# Patient Record
Sex: MALE | Race: BLACK OR AFRICAN AMERICAN | NOT HISPANIC OR LATINO | Employment: UNEMPLOYED | URBAN - METROPOLITAN AREA
[De-identification: names, ages, dates, MRNs, and addresses within clinical notes are randomized per-mention and may not be internally consistent; named-entity substitution may affect disease eponyms.]

---

## 2023-03-14 NOTE — TELEPHONE ENCOUNTER
DIAGNOSIS: toenail fungus/[facility]/ortho con   APPOINTMENT DATE: 04/11/2023   NOTES STATUS DETAILS   OFFICE NOTE from referring provider SELF

## 2023-04-11 ENCOUNTER — OFFICE VISIT (OUTPATIENT)
Dept: ORTHOPEDICS | Facility: CLINIC | Age: 53
End: 2023-04-11
Payer: COMMERCIAL

## 2023-04-11 ENCOUNTER — PRE VISIT (OUTPATIENT)
Dept: ORTHOPEDICS | Facility: CLINIC | Age: 53
End: 2023-04-11

## 2023-04-11 DIAGNOSIS — B35.1 OM (ONYCHOMYCOSIS): Primary | ICD-10-CM

## 2023-04-11 PROCEDURE — 99203 OFFICE O/P NEW LOW 30 MIN: CPT | Performed by: PODIATRIST

## 2023-04-11 RX ORDER — TADALAFIL 10 MG/1
1 TABLET ORAL DAILY PRN
COMMUNITY
Start: 2023-04-10 | End: 2023-08-08

## 2023-04-11 RX ORDER — AMLODIPINE BESYLATE 10 MG/1
10 TABLET ORAL
COMMUNITY
Start: 2023-02-27

## 2023-04-11 RX ORDER — TERBINAFINE HYDROCHLORIDE 250 MG/1
TABLET ORAL
Qty: 28 TABLET | Refills: 0 | Status: SHIPPED | OUTPATIENT
Start: 2023-04-11 | End: 2023-08-08

## 2023-04-11 RX ORDER — LISINOPRIL 5 MG/1
5 TABLET ORAL DAILY
COMMUNITY
Start: 2023-04-04 | End: 2023-08-08

## 2023-04-11 RX ORDER — CARVEDILOL 25 MG/1
25 TABLET ORAL 2 TIMES DAILY
COMMUNITY
Start: 2023-02-27 | End: 2023-08-08

## 2023-04-11 NOTE — LETTER
4/11/2023         RE: Akin Ludwig  [address]  [facility] [address]        Dear Colleague,    Thank you for referring your patient, Akin Ludwig, to the [facility] [place]. Please see a copy of my visit note below.    Date of Service: 4/11/2023    Chief Complaint:   Chief Complaint   Patient presents with     Consult     Toenail fungus        HPI: Akin is a 52 year old male who presents today for further evaluation of bilateral nail fungus.  He relates that he has tried multiple treatments for this.  History of multiple over-the-counter and prescribed topicals.  He does not want joint of the palpable.  He would like to know if there are other treatment options for him.  He does have chronic kidney disease.    Review of Systems: No nausea, vomiting, diarrhea, fever, chills, night sweats, shortness of breath, chest pain.    PMH: CKD, HTN.     PSxH: No past surgical history on file.    Allergies: Patient has no allergy information on record.    SH:   Social History     Socioeconomic History     Marital status: Single     Spouse name: Not on file     Number of children: Not on file     Years of education: Not on file     Highest education level: Not on file   Occupational History     Not on file   Tobacco Use     Smoking status: Not on file     Smokeless tobacco: Not on file   Substance and Sexual Activity     Alcohol use: Not on file     Drug use: Not on file     Sexual activity: Not on file   Other Topics Concern     Not on file   Social History Narrative     Not on file     Social Determinants of Health     Financial Resource Strain: Not on file   Food Insecurity: Not on file   Transportation Needs: Not on file   Physical Activity: Not on file   Stress: Not on file   Social Connections: Not on file   Intimate Partner Violence: Not on file   Housing Stability: Not on file       FH: No family history on file.    Objective:  Contains abnormal data PANEL  BASIC METABOLIC (BMP)  Order: [id number]   Ref Range & Units 13 d ago   CO2 22 - 30 mEq/L 26    Glucose 70 - 100 mg/dL 106 High     BUN 6 - 20 mg/dL 37 High     Creatinine 0.70 - 1.25 mg/dL 1.97 High     Calcium 8.6 - 10.0 mg/dL 9.4    Sodium 135 - 148 mEq/L 140    Potassium 3.5 - 5.3 mEq/L 4.1    Chloride 92 - 108 mEq/L 104    AnGap 8 - 16 mEq/L 10    eGFR, High >=60 ml/min/1.73m2 44 Low     Comment: Calculated using CKD-EPI equation   eGFR, Low >=60 ml/min/1.73m2 38 Low     Comment: Calculated using CKD-EPI equation   [facility]  [facility]   Specimen Collected: 03/30/23  8:07 AM Last Resulted: 03/30/23  9:51 AM   Received From: [facility]  Result Received: 04/11/23  3:56 PM       PANEL HEPATIC FUNCTION  Order: [id number]   Ref Range & Units 3 mo ago   Total Protein 6.4 - 8.3 g/dL 7.4    Albumin 3.8 - 5.1 g/dL 4.4    Bili Total 0.1 - 1.2 mg/dL 0.4    Bili Direct <=0.3 mg/dL <0.2    Alk Phos 40 - 129 IU/L 70    ALT (SGPT) <=41 IU/L 11    AST(SGOT) 5 - 40 IU/L 12    Resulting [facility]   Specimen Collected: 01/11/23  8:58 AM Last Resulted: 01/11/23 10:11 AM   Received From: [facility]  Result Received: 04/11/23  3:56 PM         PT and DP pulses are 2/4 bilaterally. CRT is instant.  Positive pedal hair.   Gross sensation is intact bilaterally.   Equinus is noted bilaterally. No pain with active or passive ROM of the ankle, MTJ, 1st ray, or halluces bilaterally,.   Nails of the left first, second, third, and fifth toes and the right lesser digits are thickened, discolored, brittle, with subungual debris. No open lesions are noted.     Assessment:   Encounter Diagnoses   Name Primary?     OM (onychomycosis) Yes         Plan:  - Pt seen and evaluated.  -We discussed treatment options.  He does have a strong desire to get this cured.  He has tried topicals.  These have not helped much.  I recommended a pulse dose of Lamisil.  This is 1 week on 250 mg and 3 weeks off.  He should continue this  for 4 months.  Hepatic panel is normal.  He does agree to this.  -We will see again in 4 months.               Again, thank you for allowing me to participate in the care of your patient.        Sincerely,        Tyrese Saldaña DPM

## 2023-04-12 NOTE — PROGRESS NOTES
Date of Service: 4/11/2023    Chief Complaint:   Chief Complaint   Patient presents with     Consult     Toenail fungus        HPI: Akin is a 52 year old male who presents today for further evaluation of bilateral nail fungus.  He relates that he has tried multiple treatments for this.  History of multiple over-the-counter and prescribed topicals.  He does not want joint of the palpable.  He would like to know if there are other treatment options for him.  He does have chronic kidney disease.    Review of Systems: No nausea, vomiting, diarrhea, fever, chills, night sweats, shortness of breath, chest pain.    PMH: CKD, HTN.     PSxH: No past surgical history on file.    Allergies: Patient has no allergy information on record.    SH:   Social History     Socioeconomic History     Marital status: Single     Spouse name: Not on file     Number of children: Not on file     Years of education: Not on file     Highest education level: Not on file   Occupational History     Not on file   Tobacco Use     Smoking status: Not on file     Smokeless tobacco: Not on file   Substance and Sexual Activity     Alcohol use: Not on file     Drug use: Not on file     Sexual activity: Not on file   Other Topics Concern     Not on file   Social History Narrative     Not on file     Social Determinants of Health     Financial Resource Strain: Not on file   Food Insecurity: Not on file   Transportation Needs: Not on file   Physical Activity: Not on file   Stress: Not on file   Social Connections: Not on file   Intimate Partner Violence: Not on file   Housing Stability: Not on file       FH: No family history on file.    Objective:  Contains abnormal data PANEL BASIC METABOLIC (BMP)  Order: [id number]   Ref Range & Units 13 d ago   CO2 22 - 30 mEq/L 26    Glucose 70 - 100 mg/dL 106 High     BUN 6 - 20 mg/dL 37 High     Creatinine 0.70 - 1.25 mg/dL 1.97 High     Calcium 8.6 - 10.0 mg/dL 9.4    Sodium 135 - 148 mEq/L 140    Potassium 3.5 -  5.3 mEq/L 4.1    Chloride 92 - 108 mEq/L 104    AnGap 8 - 16 mEq/L 10    eGFR, High >=60 ml/min/1.73m2 44 Low     Comment: Calculated using CKD-EPI equation   eGFR, Low >=60 ml/min/1.73m2 38 Low     Comment: Calculated using CKD-EPI equation   Resulting Agency  [facility]   Specimen Collected: 03/30/23  8:07 AM Last Resulted: 03/30/23  9:51 AM   Received From: [facility]  Result Received: 04/11/23  3:56 PM       PANEL HEPATIC FUNCTION  Order: [id number]   Ref Range & Units 3 mo ago   Total Protein 6.4 - 8.3 g/dL 7.4    Albumin 3.8 - 5.1 g/dL 4.4    Bili Total 0.1 - 1.2 mg/dL 0.4    Bili Direct <=0.3 mg/dL <0.2    Alk Phos 40 - 129 IU/L 70    ALT (SGPT) <=41 IU/L 11    AST(SGOT) 5 - 40 IU/L 12    Resulting [facility]   Specimen Collected: 01/11/23  8:58 AM Last Resulted: 01/11/23 10:11 AM   Received From: [facility]  Result Received: 04/11/23  3:56 PM         PT and DP pulses are 2/4 bilaterally. CRT is instant.  Positive pedal hair.   Gross sensation is intact bilaterally.   Equinus is noted bilaterally. No pain with active or passive ROM of the ankle, MTJ, 1st ray, or halluces bilaterally,.   Nails of the left first, second, third, and fifth toes and the right lesser digits are thickened, discolored, brittle, with subungual debris. No open lesions are noted.     Assessment:   Encounter Diagnoses   Name Primary?     OM (onychomycosis) Yes         Plan:  - Pt seen and evaluated.  -We discussed treatment options.  He does have a strong desire to get this cured.  He has tried topicals.  These have not helped much.  I recommended a pulse dose of Lamisil.  This is 1 week on 250 mg and 3 weeks off.  He should continue this for 4 months.  Hepatic panel is normal.  He does agree to this.  -We will see again in 4 months.

## 2023-05-21 ENCOUNTER — HEALTH MAINTENANCE LETTER (OUTPATIENT)
Age: 53
End: 2023-05-21

## 2023-08-08 ENCOUNTER — OFFICE VISIT (OUTPATIENT)
Dept: ORTHOPEDICS | Facility: CLINIC | Age: 53
End: 2023-08-08
Payer: COMMERCIAL

## 2023-08-08 DIAGNOSIS — B35.1 OM (ONYCHOMYCOSIS): Primary | ICD-10-CM

## 2023-08-08 PROCEDURE — 99212 OFFICE O/P EST SF 10 MIN: CPT | Performed by: PODIATRIST

## 2023-08-08 RX ORDER — LOSARTAN POTASSIUM 25 MG/1
1 TABLET ORAL DAILY
COMMUNITY
Start: 2023-07-18

## 2023-08-08 RX ORDER — BENZONATATE 100 MG/1
1 CAPSULE ORAL 3 TIMES DAILY
COMMUNITY
Start: 2023-07-03 | End: 2023-08-08

## 2023-08-08 NOTE — LETTER
8/8/2023         RE: Akin Ludwig  [address]  [facility] [address]        Dear Colleague,    Thank you for referring your patient, Akin Ludwig, to the [facility] [place]. Please see a copy of my visit note below.    Date of Service: 4/11/2023    Chief Complaint:   Chief Complaint   Patient presents with     RECHECK     Onychomycosis 4 month follow up         HPI: Akin is a 52 year old male who presents today for further evaluation of bilateral nail fungus.  He relates that he has tried multiple treatments for this.  History of multiple over-the-counter and prescribed topicals.  He does not want joint of the palpable.  He would like to know if there are other treatment options for him.  He does have chronic kidney disease.    Interval hx: he has taken all of the Lamisil. Nails are growing clearer.    Review of Systems: No nausea, vomiting, diarrhea, fever, chills, night sweats, shortness of breath, chest pain.    PMH: CKD, HTN.     PSxH: No past surgical history on file.    Allergies: Lisinopril    SH:   Social History     Socioeconomic History     Marital status: Single     Spouse name: Not on file     Number of children: Not on file     Years of education: Not on file     Highest education level: Not on file   Occupational History     Not on file   Tobacco Use     Smoking status: Not on file     Smokeless tobacco: Not on file   Substance and Sexual Activity     Alcohol use: Not on file     Drug use: Not on file     Sexual activity: Not on file   Other Topics Concern     Not on file   Social History Narrative     Not on file     Social Determinants of Health     Financial Resource Strain: Not on file   Food Insecurity: Not on file   Transportation Needs: Not on file   Physical Activity: Not on file   Stress: Not on file   Social Connections: Not on file   Intimate Partner Violence: Not on file   Housing Stability: Not on file       FH: No  family history on file.    Objective:  Contains abnormal data PANEL BASIC METABOLIC (BMP)  Order: [id number]   Ref Range & Units 13 d ago   CO2 22 - 30 mEq/L 26    Glucose 70 - 100 mg/dL 106 High     BUN 6 - 20 mg/dL 37 High     Creatinine 0.70 - 1.25 mg/dL 1.97 High     Calcium 8.6 - 10.0 mg/dL 9.4    Sodium 135 - 148 mEq/L 140    Potassium 3.5 - 5.3 mEq/L 4.1    Chloride 92 - 108 mEq/L 104    AnGap 8 - 16 mEq/L 10    eGFR, High >=60 ml/min/1.73m2 44 Low     Comment: Calculated using CKD-EPI equation   eGFR, Low >=60 ml/min/1.73m2 38 Low     Comment: Calculated using CKD-EPI equation   Resulting Agency  [facility]   Specimen Collected: 03/30/23  8:07 AM Last Resulted: 03/30/23  9:51 AM   Received From: [employer]  Result Received: 04/11/23  3:56 PM       PANEL HEPATIC FUNCTION  Order: [id number]   Ref Range & Units 3 mo ago   Total Protein 6.4 - 8.3 g/dL 7.4    Albumin 3.8 - 5.1 g/dL 4.4    Bili Total 0.1 - 1.2 mg/dL 0.4    Bili Direct <=0.3 mg/dL <0.2    Alk Phos 40 - 129 IU/L 70    ALT (SGPT) <=41 IU/L 11    AST(SGOT) 5 - 40 IU/L 12    Resulting Agency  [facility]   Specimen Collected: 01/11/23  8:58 AM Last Resulted: 01/11/23 10:11 AM   Received From: [employer]  Result Received: 04/11/23  3:56 PM         PT and DP pulses are 2/4 bilaterally. CRT is instant.  Positive pedal hair.   Gross sensation is intact bilaterally.   Equinus is noted bilaterally. No pain with active or passive ROM of the ankle, MTJ, 1st ray, or halluces bilaterally,.   Nails of the left first, second, third, and fifth toes and the right lesser digits are thickened, discolored, brittle, with subungual debris. Clearing noted at the eponychium of all infected digits. No open lesions are noted.     Assessment:   Encounter Diagnoses   Name Primary?     OM (onychomycosis) Yes             Plan:  - Pt seen and evaluated.  -Cont to watch the nails.   -We will see again PRN             Again, thank you for allowing me to participate in  the care of your patient.        Sincerely,        Tyrese Saldaña DPM

## 2023-08-08 NOTE — PROGRESS NOTES
Date of Service: 4/11/2023    Chief Complaint:   Chief Complaint   Patient presents with    RECHECK     Onychomycosis 4 month follow up         HPI: Akin is a 52 year old male who presents today for further evaluation of bilateral nail fungus.  He relates that he has tried multiple treatments for this.  History of multiple over-the-counter and prescribed topicals.  He does not want joint of the palpable.  He would like to know if there are other treatment options for him.  He does have chronic kidney disease.    Interval hx: he has taken all of the Lamisil. Nails are growing clearer.    Review of Systems: No nausea, vomiting, diarrhea, fever, chills, night sweats, shortness of breath, chest pain.    PMH: CKD, HTN.     PSxH: No past surgical history on file.    Allergies: Lisinopril    SH:   Social History     Socioeconomic History    Marital status: Single     Spouse name: Not on file    Number of children: Not on file    Years of education: Not on file    Highest education level: Not on file   Occupational History    Not on file   Tobacco Use    Smoking status: Not on file    Smokeless tobacco: Not on file   Substance and Sexual Activity    Alcohol use: Not on file    Drug use: Not on file    Sexual activity: Not on file   Other Topics Concern    Not on file   Social History Narrative    Not on file     Social Determinants of Health     Financial Resource Strain: Not on file   Food Insecurity: Not on file   Transportation Needs: Not on file   Physical Activity: Not on file   Stress: Not on file   Social Connections: Not on file   Intimate Partner Violence: Not on file   Housing Stability: Not on file       FH: No family history on file.    Objective:  Contains abnormal data PANEL BASIC METABOLIC (BMP)  Order: [id number]   Ref Range & Units 13 d ago   CO2 22 - 30 mEq/L 26    Glucose 70 - 100 mg/dL 106 High     BUN 6 - 20 mg/dL 37 High     Creatinine 0.70 - 1.25 mg/dL 1.97 High     Calcium 8.6 - 10.0 mg/dL 9.4     Sodium 135 - 148 mEq/L 140    Potassium 3.5 - 5.3 mEq/L 4.1    Chloride 92 - 108 mEq/L 104    AnGap 8 - 16 mEq/L 10    eGFR, High >=60 ml/min/1.73m2 44 Low     Comment: Calculated using CKD-EPI equation   eGFR, Low >=60 ml/min/1.73m2 38 Low     Comment: Calculated using CKD-EPI equation   Resulting Agency  [facility]   Specimen Collected: 03/30/23  8:07 AM Last Resulted: 03/30/23  9:51 AM   Received From: [employer]  Result Received: 04/11/23  3:56 PM       PANEL HEPATIC FUNCTION  Order: [id number]   Ref Range & Units 3 mo ago   Total Protein 6.4 - 8.3 g/dL 7.4    Albumin 3.8 - 5.1 g/dL 4.4    Bili Total 0.1 - 1.2 mg/dL 0.4    Bili Direct <=0.3 mg/dL <0.2    Alk Phos 40 - 129 IU/L 70    ALT (SGPT) <=41 IU/L 11    AST(SGOT) 5 - 40 IU/L 12    Resulting Agency  [facility]   Specimen Collected: 01/11/23  8:58 AM Last Resulted: 01/11/23 10:11 AM   Received From: [employer]  Result Received: 04/11/23  3:56 PM         PT and DP pulses are 2/4 bilaterally. CRT is instant.  Positive pedal hair.   Gross sensation is intact bilaterally.   Equinus is noted bilaterally. No pain with active or passive ROM of the ankle, MTJ, 1st ray, or halluces bilaterally,.   Nails of the left first, second, third, and fifth toes and the right lesser digits are thickened, discolored, brittle, with subungual debris. Clearing noted at the eponychium of all infected digits. No open lesions are noted.     Assessment:   Encounter Diagnoses   Name Primary?    OM (onychomycosis) Yes             Plan:  - Pt seen and evaluated.  -Cont to watch the nails.   -We will see again PRN

## 2023-08-08 NOTE — NURSING NOTE
Reason For Visit:   Chief Complaint   Patient presents with    RECHECK     Onychomycosis 4 month follow up        Pain Assessment  Patient Currently in Pain: Gaby Dixon

## 2024-07-28 ENCOUNTER — HEALTH MAINTENANCE LETTER (OUTPATIENT)
Age: 54
End: 2024-07-28

## 2025-08-10 ENCOUNTER — HEALTH MAINTENANCE LETTER (OUTPATIENT)
Age: 55
End: 2025-08-10